# Patient Record
Sex: FEMALE | Employment: UNEMPLOYED | URBAN - METROPOLITAN AREA
[De-identification: names, ages, dates, MRNs, and addresses within clinical notes are randomized per-mention and may not be internally consistent; named-entity substitution may affect disease eponyms.]

---

## 2017-02-14 ENCOUNTER — TELEPHONE (OUTPATIENT)
Dept: OBSTETRICS AND GYNECOLOGY | Facility: CLINIC | Age: 37
End: 2017-02-14

## 2017-02-14 NOTE — TELEPHONE ENCOUNTER
----- Message from Trang Swain sent at 2/14/2017  2:29 PM CST -----  Contact: PT  States she's calling regarding her cycle, 28/29 days. States she started a week earlier, it was a light pink, the second day it was only when she wiped and the third day blood. A week from today she's been bleeding. States she was suppose to start her cycle today (28 day). States normal she only has her period for three days. States should she come in or wait until her cycle stops. States does she need to do some blood work. Please call pt at [phone]. Thank you

## 2018-10-15 ENCOUNTER — TELEPHONE (OUTPATIENT)
Dept: OBSTETRICS AND GYNECOLOGY | Facility: CLINIC | Age: 38
End: 2018-10-15

## 2018-10-15 NOTE — TELEPHONE ENCOUNTER
Called number given. Someone answered and stated we have wrong number. Called number in chart and it does not ring just has a disconnected sound.

## 2018-10-15 NOTE — TELEPHONE ENCOUNTER
----- Message from Abhijit Waller sent at 10/15/2018 10:39 AM CDT -----  Contact: pt   Pt would like to be worked in for annual wellness and possible lump.           ..[phone]